# Patient Record
Sex: MALE | Race: WHITE | Employment: OTHER | URBAN - METROPOLITAN AREA
[De-identification: names, ages, dates, MRNs, and addresses within clinical notes are randomized per-mention and may not be internally consistent; named-entity substitution may affect disease eponyms.]

---

## 2017-07-12 ENCOUNTER — MED REC SCAN ONLY (OUTPATIENT)
Dept: FAMILY MEDICINE CLINIC | Facility: CLINIC | Age: 82
End: 2017-07-12

## 2017-09-27 ENCOUNTER — MED REC SCAN ONLY (OUTPATIENT)
Dept: FAMILY MEDICINE CLINIC | Facility: CLINIC | Age: 82
End: 2017-09-27

## 2017-11-01 ENCOUNTER — MED REC SCAN ONLY (OUTPATIENT)
Dept: FAMILY MEDICINE CLINIC | Facility: CLINIC | Age: 82
End: 2017-11-01

## 2018-05-02 ENCOUNTER — APPOINTMENT (OUTPATIENT)
Dept: LAB | Age: 83
End: 2018-05-02
Attending: FAMILY MEDICINE
Payer: MEDICARE

## 2018-05-02 ENCOUNTER — OFFICE VISIT (OUTPATIENT)
Dept: FAMILY MEDICINE CLINIC | Facility: CLINIC | Age: 83
End: 2018-05-02

## 2018-05-02 VITALS
TEMPERATURE: 98 F | RESPIRATION RATE: 16 BRPM | HEIGHT: 65 IN | DIASTOLIC BLOOD PRESSURE: 74 MMHG | SYSTOLIC BLOOD PRESSURE: 178 MMHG | BODY MASS INDEX: 26.33 KG/M2 | WEIGHT: 158 LBS | HEART RATE: 80 BPM

## 2018-05-02 DIAGNOSIS — E78.00 PURE HYPERCHOLESTEROLEMIA: ICD-10-CM

## 2018-05-02 DIAGNOSIS — C61 PROSTATE CANCER (HCC): ICD-10-CM

## 2018-05-02 DIAGNOSIS — M15.9 PRIMARY OSTEOARTHRITIS INVOLVING MULTIPLE JOINTS: ICD-10-CM

## 2018-05-02 DIAGNOSIS — H90.6 MIXED CONDUCTIVE AND SENSORINEURAL HEARING LOSS OF BOTH EARS: ICD-10-CM

## 2018-05-02 DIAGNOSIS — R30.0 DYSURIA: ICD-10-CM

## 2018-05-02 DIAGNOSIS — Z00.00 ENCOUNTER FOR ANNUAL HEALTH EXAMINATION: ICD-10-CM

## 2018-05-02 DIAGNOSIS — I10 BENIGN ESSENTIAL HYPERTENSION: Primary | ICD-10-CM

## 2018-05-02 PROCEDURE — 80048 BASIC METABOLIC PNL TOTAL CA: CPT | Performed by: FAMILY MEDICINE

## 2018-05-02 PROCEDURE — 36415 COLL VENOUS BLD VENIPUNCTURE: CPT | Performed by: FAMILY MEDICINE

## 2018-05-02 PROCEDURE — G0439 PPPS, SUBSEQ VISIT: HCPCS | Performed by: FAMILY MEDICINE

## 2018-05-02 RX ORDER — SIMVASTATIN 10 MG
1 TABLET ORAL DAILY
COMMUNITY
Start: 2012-04-18

## 2018-05-02 RX ORDER — HYDROCHLOROTHIAZIDE 12.5 MG/1
12.5 TABLET ORAL DAILY
Qty: 30 TABLET | Refills: 5 | Status: SHIPPED | OUTPATIENT
Start: 2018-05-02 | End: 2018-05-14

## 2018-05-02 RX ORDER — LISINOPRIL 40 MG/1
40 TABLET ORAL DAILY
COMMUNITY

## 2018-05-02 RX ORDER — AMLODIPINE BESYLATE 10 MG/1
10 TABLET ORAL DAILY
COMMUNITY

## 2018-05-02 NOTE — PATIENT INSTRUCTIONS
Start taking HCTZ 12.5 mg daily. Recommended Websites for Advanced Directives    [email or web address] org/publications/[email or web address]/personal_[email or web address] pdf  An information packet, including necessary form from the [employer] website.      http://w

## 2018-05-02 NOTE — PROGRESS NOTES
[facility] [place]  PROGRESS NOTE  Chief Complaint:   Patient presents with:  Physical      HPI:   This is a 80year old male coming in for his annual Medicare wellness exam.    He has 2 main concerns today.   First, his blood pressure has been i tobacco: Never Used                      Alcohol use:  Yes              Comment: occasionally    Family History:  Family History   Problem Relation Age of Onset   • Stroke Father    • Other [OTHER] Mother      Allergies:  No Known Allergies  Current Meds: asthma, sneezing, hives, eczema or rhinitis.      EXAM:   BP (!) 178/74 (BP Location: Right arm, Patient Position: Sitting, Cuff Size: adult)   Pulse 80   Temp 97.7 °F (36.5 °C) (Temporal)   Resp 16   Ht 65\"   Wt 158 lb   BMI 26.29 kg/m²  Estimated body ma his lisinopril and amlodipine faithfully but they are not adequate to control his blood pressure. Plan: Add hydrochlorothiazide 12.5 mg p.o. daily. Recheck blood pressure in 1 month. - BASIC METABOLIC PANEL (8); Future  - BASIC METABOLIC PANEL (8)    2. understanding. Patient is notified to call with any questions, complications, allergies, or worsening or changing symptoms. Patient is to call with any side effects or complications from the treatments as a result of today.      Problem List:  Patient Acti

## 2018-05-14 ENCOUNTER — OFFICE VISIT (OUTPATIENT)
Dept: FAMILY MEDICINE CLINIC | Facility: CLINIC | Age: 83
End: 2018-05-14

## 2018-05-14 ENCOUNTER — TELEPHONE (OUTPATIENT)
Dept: FAMILY MEDICINE CLINIC | Facility: CLINIC | Age: 83
End: 2018-05-14

## 2018-05-14 VITALS
WEIGHT: 159.5 LBS | RESPIRATION RATE: 14 BRPM | BODY MASS INDEX: 26.57 KG/M2 | HEART RATE: 72 BPM | HEIGHT: 65 IN | TEMPERATURE: 98 F | SYSTOLIC BLOOD PRESSURE: 158 MMHG | DIASTOLIC BLOOD PRESSURE: 66 MMHG

## 2018-05-14 DIAGNOSIS — I10 BENIGN ESSENTIAL HYPERTENSION: Primary | ICD-10-CM

## 2018-05-14 PROCEDURE — 99213 OFFICE O/P EST LOW 20 MIN: CPT | Performed by: FAMILY MEDICINE

## 2018-05-14 RX ORDER — CARVEDILOL 6.25 MG/1
6.25 TABLET ORAL 2 TIMES DAILY WITH MEALS
Qty: 60 TABLET | Refills: 5 | Status: SHIPPED | OUTPATIENT
Start: 2018-05-14 | End: 2018-05-30

## 2018-05-14 NOTE — PROGRESS NOTES
[address]  PROGRESS NOTE  Chief Complaint:   Patient presents with: Follow - Up  Hypertension      HPI:   This is a 80year old male coming in for follow-up on his hypertension.     He reports that the hydrochlorothiazide has not been e Meds:    Current Outpatient Prescriptions:  carvedilol 6.25 MG Oral Tab Take 1 tablet (6.25 mg total) by mouth 2 (two) times daily with meals. Disp: 60 tablet Rfl: 5   AmLODIPine Besylate 10 MG Oral Tab Take 10 mg by mouth daily.  Disp:  Rfl:    lisinopril oral lesions or ulcerations, good dentition. NECK: Supple, no CLAD, no JVD, no thyromegaly. SKIN: No rashes, no skin lesion, no bruising, good turgor. HEART:  Regular rate and rhythm, no murmurs, rubs or gallops.   LUNGS: Clear to auscultation bilterally

## 2018-05-14 NOTE — TELEPHONE ENCOUNTER
Patient states His B/P remains elevated in the 150's. Would like to be seen by Dr Vidal Galicia sooner than 5/30. Appt given today 3pm with Dr Vidal Galicia.   Mason Wylie, 05/14/18, 11:48 AM

## 2018-05-30 ENCOUNTER — OFFICE VISIT (OUTPATIENT)
Dept: FAMILY MEDICINE CLINIC | Facility: CLINIC | Age: 83
End: 2018-05-30

## 2018-05-30 VITALS
WEIGHT: 157 LBS | RESPIRATION RATE: 16 BRPM | HEART RATE: 64 BPM | BODY MASS INDEX: 26.16 KG/M2 | TEMPERATURE: 98 F | HEIGHT: 65 IN | SYSTOLIC BLOOD PRESSURE: 132 MMHG | DIASTOLIC BLOOD PRESSURE: 54 MMHG

## 2018-05-30 DIAGNOSIS — I10 BENIGN ESSENTIAL HYPERTENSION: Primary | ICD-10-CM

## 2018-05-30 PROCEDURE — 99213 OFFICE O/P EST LOW 20 MIN: CPT | Performed by: FAMILY MEDICINE

## 2018-05-30 RX ORDER — CARVEDILOL 12.5 MG/1
12.5 TABLET ORAL 2 TIMES DAILY WITH MEALS
Qty: 60 TABLET | Refills: 5 | Status: SHIPPED | OUTPATIENT
Start: 2018-05-30 | End: 2018-06-12

## 2018-05-30 NOTE — PROGRESS NOTES
[address]  PROGRESS NOTE  Chief Complaint:   Patient presents with: Follow - Up      HPI:   This is a 80year old male coming in for follow-up on his blood pressure medications.   He is not having any side effects or problems with the c cholecalciferol (D3-1000) 1000 units Oral Cap Take 1,000 Units by mouth daily. Disp:  Rfl:    carvedilol 12.5 MG Oral Tab Take 1 tablet (12.5 mg total) by mouth 2 (two) times daily with meals.  Disp: 60 tablet Rfl: 5   AmLODIPine Besylate 10 MG Oral Tab T Estimated body mass index is 26.13 kg/m² as calculated from the following:    Height as of this encounter: 65\". Weight as of this encounter: 157 lb. Vital signs reviewed.   Physical Exam:  GEN:  Patient is alert, awake and oriented, well developed, w ([facility].)     Dysuria      Brianna Farley MD  5/30/2018  11:54 AM

## 2018-06-12 ENCOUNTER — OFFICE VISIT (OUTPATIENT)
Dept: FAMILY MEDICINE CLINIC | Facility: CLINIC | Age: 83
End: 2018-06-12

## 2018-06-12 VITALS
RESPIRATION RATE: 16 BRPM | TEMPERATURE: 98 F | SYSTOLIC BLOOD PRESSURE: 124 MMHG | DIASTOLIC BLOOD PRESSURE: 52 MMHG | WEIGHT: 156.81 LBS | BODY MASS INDEX: 26.77 KG/M2 | HEART RATE: 68 BPM | HEIGHT: 64 IN

## 2018-06-12 DIAGNOSIS — I10 BENIGN ESSENTIAL HYPERTENSION: Primary | ICD-10-CM

## 2018-06-12 PROCEDURE — 99213 OFFICE O/P EST LOW 20 MIN: CPT | Performed by: FAMILY MEDICINE

## 2018-06-12 RX ORDER — CARVEDILOL 25 MG/1
25 TABLET ORAL 2 TIMES DAILY WITH MEALS
Qty: 60 TABLET | Refills: 5 | Status: SHIPPED | OUTPATIENT
Start: 2018-06-12 | End: 2018-07-13

## 2018-06-12 NOTE — PROGRESS NOTES
[address]  PROGRESS NOTE  Chief Complaint:   Patient presents with: Follow - Up      HPI:   This is a 80year old male coming in for follow-up on his blood pressure. He is not having any side effects from the carvedilol.   He said his daily with meals. Disp: 60 tablet Rfl: 5   Multiple Vitamins-Minerals (PRESERVISION AREDS 2 OR) Take 2 capsules by mouth daily. Disp:  Rfl:    cholecalciferol (D3-1000) 1000 units Oral Cap Take 1,000 Units by mouth daily.  Disp:  Rfl:    AmLODIPine Besylate rales/rhonchi/wheezing. ASSESSMENT AND PLAN:   1. Benign essential hypertension  His blood pressure numbers are better but still not where we would like it to be. Plan: Increase carvedilol to 25 mg twice a day. Recheck in 1 month.       Meds & Refills fo

## 2018-07-13 ENCOUNTER — OFFICE VISIT (OUTPATIENT)
Dept: FAMILY MEDICINE CLINIC | Facility: CLINIC | Age: 83
End: 2018-07-13

## 2018-07-13 VITALS
HEART RATE: 62 BPM | WEIGHT: 158.38 LBS | HEIGHT: 65 IN | BODY MASS INDEX: 26.39 KG/M2 | TEMPERATURE: 97 F | DIASTOLIC BLOOD PRESSURE: 68 MMHG | RESPIRATION RATE: 14 BRPM | SYSTOLIC BLOOD PRESSURE: 134 MMHG

## 2018-07-13 DIAGNOSIS — I10 BENIGN ESSENTIAL HYPERTENSION: Primary | ICD-10-CM

## 2018-07-13 DIAGNOSIS — H90.6 MIXED CONDUCTIVE AND SENSORINEURAL HEARING LOSS OF BOTH EARS: ICD-10-CM

## 2018-07-13 DIAGNOSIS — E78.00 PURE HYPERCHOLESTEROLEMIA: ICD-10-CM

## 2018-07-13 PROCEDURE — 99213 OFFICE O/P EST LOW 20 MIN: CPT | Performed by: FAMILY MEDICINE

## 2018-07-13 RX ORDER — SPIRONOLACTONE 25 MG/1
25 TABLET ORAL DAILY
Qty: 30 TABLET | Refills: 5 | Status: SHIPPED | OUTPATIENT
Start: 2018-07-13 | End: 2018-08-15

## 2018-07-13 NOTE — PROGRESS NOTES
[address]  PROGRESS NOTE  Chief Complaint:   Patient presents with: Follow - Up  Hypertension      HPI:   This is a 80year old male coming in for follow-up on his hypertension.   He said that when he increase the carvedilol to 25 mg tw Stroke Father    • Other [place] Mother      Allergies:  No Known Allergies  Current Meds:    Current Outpatient Prescriptions:  spironolactone 25 MG Oral Tab Take 1 tablet (25 mg total) by mouth daily.  Disp: 30 tablet Rfl: 5   Multiple Vitamins-Minerals ( /68 (BP Location: Left arm, Patient Position: Sitting, Cuff Size: adult)   Pulse 62   Temp 97.4 °F (36.3 °C) (Tympanic)   Resp 14   Ht 65\"   Wt 158 lb 6 oz   BMI 26.35 kg/m²  Estimated body mass index is 26.35 kg/m² as calculated from the followin tablet 5      Sig: Take 1 tablet (25 mg total) by mouth daily.            Health Maintenance:  Pneumococcal PPSV23/PCV13 65+ / High and Highest Risk(2 of 2 - PPSV23) due on 11/23/2015    Patient/Caregiver Education: Patient/Caregiver Education: There are no

## 2018-08-15 ENCOUNTER — OFFICE VISIT (OUTPATIENT)
Dept: FAMILY MEDICINE CLINIC | Facility: CLINIC | Age: 83
End: 2018-08-15
Payer: MEDICARE

## 2018-08-15 VITALS
OXYGEN SATURATION: 99 % | SYSTOLIC BLOOD PRESSURE: 122 MMHG | HEART RATE: 56 BPM | BODY MASS INDEX: 25.99 KG/M2 | WEIGHT: 156 LBS | TEMPERATURE: 98 F | DIASTOLIC BLOOD PRESSURE: 52 MMHG | HEIGHT: 65 IN | RESPIRATION RATE: 18 BRPM

## 2018-08-15 DIAGNOSIS — I10 BENIGN ESSENTIAL HYPERTENSION: Primary | ICD-10-CM

## 2018-08-15 DIAGNOSIS — E78.00 PURE HYPERCHOLESTEROLEMIA: ICD-10-CM

## 2018-08-15 PROCEDURE — 99213 OFFICE O/P EST LOW 20 MIN: CPT | Performed by: FAMILY MEDICINE

## 2018-08-15 RX ORDER — CARVEDILOL 25 MG/1
12.5 TABLET ORAL 2 TIMES DAILY WITH MEALS
COMMUNITY
Start: 2018-08-15

## 2018-08-15 NOTE — PROGRESS NOTES
[address]  PROGRESS NOTE  Chief Complaint:   Patient presents with: Follow - Up  Blood Pressure      HPI:   This is a 80year old male coming in for follow-up on his blood pressure. He started taking the Spironolactone daily.   He di Packs/day: 0.00      Years: 0.00      Smokeless tobacco: Never Used                      Alcohol use:  Yes              Comment: occasionally    Family History:  Family History   Problem Relation Age of Onset   • Stroke Father    • Other [OTH splenectomy. PSYCHIATRIC:  Denies depression or anxiety. ENDOCRINOLOGIC:  Denies excessive sweating, cold or heat intolerance, polyuria or polydipsia. ALLERGIES:  Denies allergic response, history of asthma, sneezing, hives, eczema or rhinitis.      EXAM and Highest Risk(2 of 2 - PPSV23) due on 11/23/2015    Patient/Caregiver Education: Patient/Caregiver Education: There are no barriers to learning. Medical education done. Outcome: Patient verbalizes understanding.  Patient is notified to call with any qu

## 2018-10-10 ENCOUNTER — TELEPHONE (OUTPATIENT)
Dept: FAMILY MEDICINE CLINIC | Facility: CLINIC | Age: 83
End: 2018-10-10

## 2018-11-12 ENCOUNTER — TELEPHONE (OUTPATIENT)
Dept: FAMILY MEDICINE CLINIC | Facility: CLINIC | Age: 83
End: 2018-11-12

## 2018-11-14 ENCOUNTER — TELEPHONE (OUTPATIENT)
Dept: FAMILY MEDICINE CLINIC | Facility: CLINIC | Age: 83
End: 2018-11-14

## 2018-11-14 NOTE — TELEPHONE ENCOUNTER
We received a signed authorization to release records from patient. He is requesting a copy of all of his medical history information. This was sent to [facility].

## 2019-07-27 PROBLEM — R10.9 FLANK PAIN: Status: ACTIVE | Noted: 2018-10-23

## 2023-06-13 NOTE — TELEPHONE ENCOUNTER
Addended by: RUFINA KEARNEY on: 6/13/2023 04:14 PM     Modules accepted: Orders     was seen 5/2 and was given a bp rx - does not feel like this is working for him- wants to know if he needs to come in before his next appt on 5/30